# Patient Record
Sex: MALE | Race: BLACK OR AFRICAN AMERICAN | NOT HISPANIC OR LATINO | URBAN - METROPOLITAN AREA
[De-identification: names, ages, dates, MRNs, and addresses within clinical notes are randomized per-mention and may not be internally consistent; named-entity substitution may affect disease eponyms.]

---

## 2023-01-27 ENCOUNTER — INPATIENT HOSPITAL (AMBULATORY)
Dept: URBAN - METROPOLITAN AREA HOSPITAL 40 | Facility: HOSPITAL | Age: 23
End: 2023-01-27

## 2023-01-27 DIAGNOSIS — K52.9 NONINFECTIVE GASTROENTERITIS AND COLITIS, UNSPECIFIED: ICD-10-CM

## 2023-01-27 DIAGNOSIS — K50.90 CROHN'S DISEASE, UNSPECIFIED, WITHOUT COMPLICATIONS: ICD-10-CM

## 2023-01-27 DIAGNOSIS — K65.1 PERITONEAL ABSCESS: ICD-10-CM

## 2023-01-27 PROCEDURE — 99222 1ST HOSP IP/OBS MODERATE 55: CPT | Performed by: INTERNAL MEDICINE

## 2023-01-30 PROCEDURE — 99232 SBSQ HOSP IP/OBS MODERATE 35: CPT | Performed by: INTERNAL MEDICINE

## 2023-01-31 PROCEDURE — 99232 SBSQ HOSP IP/OBS MODERATE 35: CPT | Performed by: INTERNAL MEDICINE

## 2023-03-08 ENCOUNTER — OFFICE (AMBULATORY)
Dept: URBAN - METROPOLITAN AREA CLINIC 10 | Facility: CLINIC | Age: 23
End: 2023-03-08
Payer: COMMERCIAL

## 2023-03-08 VITALS
WEIGHT: 165 LBS | SYSTOLIC BLOOD PRESSURE: 120 MMHG | OXYGEN SATURATION: 99 % | DIASTOLIC BLOOD PRESSURE: 69 MMHG | HEIGHT: 71 IN | HEART RATE: 91 BPM

## 2023-03-08 DIAGNOSIS — K50.90 CROHN'S DISEASE, UNSPECIFIED, WITHOUT COMPLICATIONS: ICD-10-CM

## 2023-03-08 PROCEDURE — 99214 OFFICE O/P EST MOD 30 MIN: CPT | Performed by: REGISTERED NURSE

## 2023-03-10 ENCOUNTER — AMBULATORY SURGICAL CENTER (AMBULATORY)
Dept: URBAN - METROPOLITAN AREA SURGERY 1 | Facility: SURGERY | Age: 23
End: 2023-03-10
Payer: COMMERCIAL

## 2023-03-10 ENCOUNTER — OFFICE (AMBULATORY)
Dept: URBAN - METROPOLITAN AREA PATHOLOGY 21 | Facility: PATHOLOGY | Age: 23
End: 2023-03-10
Payer: COMMERCIAL

## 2023-03-10 VITALS
DIASTOLIC BLOOD PRESSURE: 61 MMHG | HEART RATE: 101 BPM | DIASTOLIC BLOOD PRESSURE: 49 MMHG | SYSTOLIC BLOOD PRESSURE: 123 MMHG | HEART RATE: 115 BPM | HEART RATE: 98 BPM | RESPIRATION RATE: 18 BRPM | TEMPERATURE: 97.6 F | OXYGEN SATURATION: 96 % | OXYGEN SATURATION: 97 % | HEIGHT: 71 IN | DIASTOLIC BLOOD PRESSURE: 71 MMHG | HEART RATE: 98 BPM | RESPIRATION RATE: 18 BRPM | HEART RATE: 95 BPM | OXYGEN SATURATION: 96 % | SYSTOLIC BLOOD PRESSURE: 88 MMHG | SYSTOLIC BLOOD PRESSURE: 109 MMHG | HEART RATE: 101 BPM | TEMPERATURE: 98.2 F | OXYGEN SATURATION: 95 % | DIASTOLIC BLOOD PRESSURE: 49 MMHG | HEIGHT: 71 IN | SYSTOLIC BLOOD PRESSURE: 111 MMHG | DIASTOLIC BLOOD PRESSURE: 66 MMHG | OXYGEN SATURATION: 97 % | SYSTOLIC BLOOD PRESSURE: 109 MMHG | DIASTOLIC BLOOD PRESSURE: 65 MMHG | HEART RATE: 96 BPM | HEART RATE: 98 BPM | OXYGEN SATURATION: 95 % | WEIGHT: 162 LBS | OXYGEN SATURATION: 95 % | RESPIRATION RATE: 18 BRPM | HEART RATE: 96 BPM | HEART RATE: 115 BPM | WEIGHT: 162 LBS | WEIGHT: 162 LBS | RESPIRATION RATE: 16 BRPM | DIASTOLIC BLOOD PRESSURE: 66 MMHG | DIASTOLIC BLOOD PRESSURE: 65 MMHG | OXYGEN SATURATION: 97 % | TEMPERATURE: 98.2 F | SYSTOLIC BLOOD PRESSURE: 123 MMHG | SYSTOLIC BLOOD PRESSURE: 109 MMHG | TEMPERATURE: 97.6 F | HEART RATE: 96 BPM | DIASTOLIC BLOOD PRESSURE: 49 MMHG | SYSTOLIC BLOOD PRESSURE: 111 MMHG | HEART RATE: 95 BPM | HEART RATE: 95 BPM | TEMPERATURE: 98.2 F | HEART RATE: 101 BPM | DIASTOLIC BLOOD PRESSURE: 65 MMHG | SYSTOLIC BLOOD PRESSURE: 88 MMHG | DIASTOLIC BLOOD PRESSURE: 66 MMHG | HEIGHT: 71 IN | SYSTOLIC BLOOD PRESSURE: 111 MMHG | DIASTOLIC BLOOD PRESSURE: 71 MMHG | RESPIRATION RATE: 16 BRPM | SYSTOLIC BLOOD PRESSURE: 123 MMHG | DIASTOLIC BLOOD PRESSURE: 61 MMHG | OXYGEN SATURATION: 96 % | DIASTOLIC BLOOD PRESSURE: 71 MMHG | SYSTOLIC BLOOD PRESSURE: 88 MMHG | RESPIRATION RATE: 16 BRPM | DIASTOLIC BLOOD PRESSURE: 61 MMHG | TEMPERATURE: 97.6 F | HEART RATE: 115 BPM

## 2023-03-10 DIAGNOSIS — K50.90 CROHN'S DISEASE, UNSPECIFIED, WITHOUT COMPLICATIONS: ICD-10-CM

## 2023-03-10 PROBLEM — K63.3 ULCER OF INTESTINE: Status: ACTIVE | Noted: 2023-03-10

## 2023-03-10 PROBLEM — K63.89 OTHER SPECIFIED DISEASES OF INTESTINE: Status: ACTIVE | Noted: 2023-03-10

## 2023-03-10 LAB
ANEMIA PROFILE A: BASO (ABSOLUTE): 0.1 X10E3/UL (ref 0–0.2)
ANEMIA PROFILE A: BASOS: 0 %
ANEMIA PROFILE A: EOS (ABSOLUTE): 0.1 X10E3/UL (ref 0–0.4)
ANEMIA PROFILE A: EOS: 1 %
ANEMIA PROFILE A: HEMATOCRIT: 43.6 % (ref 37.5–51)
ANEMIA PROFILE A: HEMOGLOBIN: 14.2 G/DL (ref 13–17.7)
ANEMIA PROFILE A: IMMATURE GRANS (ABS): 0 X10E3/UL (ref 0–0.1)
ANEMIA PROFILE A: IMMATURE GRANULOCYTES: 0 %
ANEMIA PROFILE A: IRON BIND.CAP.(TIBC): 276 UG/DL (ref 250–450)
ANEMIA PROFILE A: IRON SATURATION: 11 % — LOW (ref 15–55)
ANEMIA PROFILE A: IRON: 29 UG/DL — LOW (ref 38–169)
ANEMIA PROFILE A: LYMPHS (ABSOLUTE): 1.9 X10E3/UL (ref 0.7–3.1)
ANEMIA PROFILE A: LYMPHS: 16 %
ANEMIA PROFILE A: MCH: 26.9 PG (ref 26.6–33)
ANEMIA PROFILE A: MCHC: 32.6 G/DL (ref 31.5–35.7)
ANEMIA PROFILE A: MCV: 83 FL (ref 79–97)
ANEMIA PROFILE A: MONOCYTES(ABSOLUTE): 0.7 X10E3/UL (ref 0.1–0.9)
ANEMIA PROFILE A: MONOCYTES: 6 %
ANEMIA PROFILE A: NEUTROPHILS (ABSOLUTE): 8.9 X10E3/UL — HIGH (ref 1.4–7)
ANEMIA PROFILE A: NEUTROPHILS: 77 %
ANEMIA PROFILE A: PLATELETS: 487 X10E3/UL — HIGH (ref 150–450)
ANEMIA PROFILE A: RBC: 5.28 X10E6/UL (ref 4.14–5.8)
ANEMIA PROFILE A: RDW: 16.6 % — HIGH (ref 11.6–15.4)
ANEMIA PROFILE A: RETICULOCYTE COUNT: 0.9 % (ref 0.6–2.6)
ANEMIA PROFILE A: UIBC: 247 UG/DL (ref 111–343)
ANEMIA PROFILE A: WBC: 11.7 X10E3/UL — HIGH (ref 3.4–10.8)
C-REACTIVE PROTEIN, QUANT: 60 MG/L — HIGH (ref 0–10)
COMP. METABOLIC PANEL (14): A/G RATIO: 1.5 (ref 1.2–2.2)
COMP. METABOLIC PANEL (14): ALBUMIN: 4.3 G/DL (ref 4.1–5.2)
COMP. METABOLIC PANEL (14): ALKALINE PHOSPHATASE: 113 IU/L (ref 44–121)
COMP. METABOLIC PANEL (14): ALT (SGPT): 17 IU/L (ref 0–44)
COMP. METABOLIC PANEL (14): AST (SGOT): 13 IU/L (ref 0–40)
COMP. METABOLIC PANEL (14): BILIRUBIN, TOTAL: 0.3 MG/DL (ref 0–1.2)
COMP. METABOLIC PANEL (14): BUN/CREATININE RATIO: 10 (ref 9–20)
COMP. METABOLIC PANEL (14): BUN: 9 MG/DL (ref 6–20)
COMP. METABOLIC PANEL (14): CALCIUM: 10 MG/DL (ref 8.7–10.2)
COMP. METABOLIC PANEL (14): CARBON DIOXIDE, TOTAL: 25 MMOL/L (ref 20–29)
COMP. METABOLIC PANEL (14): CHLORIDE: 104 MMOL/L (ref 96–106)
COMP. METABOLIC PANEL (14): CREATININE: 0.89 MG/DL (ref 0.76–1.27)
COMP. METABOLIC PANEL (14): EGFR: 124 ML/MIN/1.73 (ref 59–?)
COMP. METABOLIC PANEL (14): GLOBULIN, TOTAL: 2.8 G/DL (ref 1.5–4.5)
COMP. METABOLIC PANEL (14): GLUCOSE: 93 MG/DL (ref 70–99)
COMP. METABOLIC PANEL (14): POTASSIUM: 4.7 MMOL/L (ref 3.5–5.2)
COMP. METABOLIC PANEL (14): PROTEIN, TOTAL: 7.1 G/DL (ref 6–8.5)
COMP. METABOLIC PANEL (14): SODIUM: 144 MMOL/L (ref 134–144)
HBSAG SCREEN: NEGATIVE
QUANTIFERON-TB GOLD PLUS: NEGATIVE
QUANTIFERON-TB GOLD PLUS: QUANTIFERON CRITERIA: (no result)
QUANTIFERON-TB GOLD PLUS: QUANTIFERON INCUBATION: (no result)
QUANTIFERON-TB GOLD PLUS: QUANTIFERON MITOGEN VALUE: 8.13 IU/ML
QUANTIFERON-TB GOLD PLUS: QUANTIFERON NIL VALUE: 0.41 IU/ML
QUANTIFERON-TB GOLD PLUS: QUANTIFERON TB1 AG VALUE: 0.23 IU/ML
QUANTIFERON-TB GOLD PLUS: QUANTIFERON TB2 AG VALUE: 0.3 IU/ML
SEDIMENTATION RATE-WESTERGREN: 24 MM/HR — HIGH (ref 0–15)

## 2023-03-10 PROCEDURE — 88305 TISSUE EXAM BY PATHOLOGIST: CPT | Performed by: PATHOLOGY

## 2023-03-10 PROCEDURE — 45380 COLONOSCOPY AND BIOPSY: CPT | Performed by: INTERNAL MEDICINE

## 2023-03-10 PROCEDURE — 88342 IMHCHEM/IMCYTCHM 1ST ANTB: CPT | Performed by: PATHOLOGY

## 2023-04-07 ENCOUNTER — OFFICE (AMBULATORY)
Dept: URBAN - METROPOLITAN AREA CLINIC 11 | Facility: CLINIC | Age: 23
End: 2023-04-07
Payer: COMMERCIAL

## 2023-04-07 VITALS
TEMPERATURE: 98.3 F | HEART RATE: 82 BPM | SYSTOLIC BLOOD PRESSURE: 114 MMHG | DIASTOLIC BLOOD PRESSURE: 71 MMHG | TEMPERATURE: 98.1 F | HEART RATE: 64 BPM | DIASTOLIC BLOOD PRESSURE: 65 MMHG | SYSTOLIC BLOOD PRESSURE: 110 MMHG | SYSTOLIC BLOOD PRESSURE: 111 MMHG | DIASTOLIC BLOOD PRESSURE: 63 MMHG | HEART RATE: 93 BPM | SYSTOLIC BLOOD PRESSURE: 112 MMHG | SYSTOLIC BLOOD PRESSURE: 106 MMHG | HEIGHT: 71 IN | SYSTOLIC BLOOD PRESSURE: 101 MMHG | HEART RATE: 83 BPM | WEIGHT: 166 LBS | HEART RATE: 92 BPM | DIASTOLIC BLOOD PRESSURE: 69 MMHG | DIASTOLIC BLOOD PRESSURE: 66 MMHG | DIASTOLIC BLOOD PRESSURE: 64 MMHG | HEART RATE: 65 BPM | RESPIRATION RATE: 18 BRPM | SYSTOLIC BLOOD PRESSURE: 105 MMHG | HEART RATE: 69 BPM

## 2023-04-07 DIAGNOSIS — K50.90 CROHN'S DISEASE, UNSPECIFIED, WITHOUT COMPLICATIONS: ICD-10-CM

## 2023-04-07 PROCEDURE — 96413 CHEMO IV INFUSION 1 HR: CPT | Performed by: INTERNAL MEDICINE

## 2023-04-07 PROCEDURE — 96415 CHEMO IV INFUSION ADDL HR: CPT | Performed by: INTERNAL MEDICINE

## 2023-04-07 NOTE — SERVICENOTES
Next Avsola infusion is on 4/21/23  at 1130 am

Patient observed for 15 minutes post infusion. 
Patient denies chest pain, shortness of breath or dizziness.  
Handout given and reviewed with patient.
Patient was instructed on common side effects.
Patient verbalized a complete understanding and has no questions.

## 2023-04-07 NOTE — SERVICEHPINOTES
See follow up visit from  3/8/2023   .  brDate / Results of last TB test  3/8/2023   -   Negative  brLabs and/or Additional orders:CBC &amp CMP due in July   brInsurance authorization: [facility] approved Avsola induction 3/21/23-9/20/23(BB)brPharmacy:  Buy and Bill  brRate of Infusion:  Standard   
br   Infusion order placed on:  3/24/2023   
br

## 2023-04-21 ENCOUNTER — OFFICE (AMBULATORY)
Dept: URBAN - METROPOLITAN AREA CLINIC 11 | Facility: CLINIC | Age: 23
End: 2023-04-21
Payer: COMMERCIAL

## 2023-04-21 VITALS
DIASTOLIC BLOOD PRESSURE: 65 MMHG | DIASTOLIC BLOOD PRESSURE: 72 MMHG | SYSTOLIC BLOOD PRESSURE: 107 MMHG | SYSTOLIC BLOOD PRESSURE: 108 MMHG | RESPIRATION RATE: 18 BRPM | HEART RATE: 69 BPM | DIASTOLIC BLOOD PRESSURE: 74 MMHG | HEART RATE: 60 BPM | HEART RATE: 68 BPM | WEIGHT: 170.4 LBS | TEMPERATURE: 98.1 F | HEART RATE: 66 BPM | HEIGHT: 71 IN | TEMPERATURE: 98.7 F | DIASTOLIC BLOOD PRESSURE: 60 MMHG | HEART RATE: 65 BPM | DIASTOLIC BLOOD PRESSURE: 68 MMHG | SYSTOLIC BLOOD PRESSURE: 121 MMHG | SYSTOLIC BLOOD PRESSURE: 110 MMHG | SYSTOLIC BLOOD PRESSURE: 115 MMHG

## 2023-04-21 DIAGNOSIS — K50.90 CROHN'S DISEASE, UNSPECIFIED, WITHOUT COMPLICATIONS: ICD-10-CM

## 2023-04-21 PROCEDURE — 96413 CHEMO IV INFUSION 1 HR: CPT | Performed by: INTERNAL MEDICINE

## 2023-04-21 PROCEDURE — 96415 CHEMO IV INFUSION ADDL HR: CPT | Performed by: INTERNAL MEDICINE

## 2023-04-21 NOTE — SERVICEHPINOTES
See follow up visit from  3/8/2023   .  brDate / Results of last TB test  3/8/2023   -   Negative  brLabs and/or Additional orders: CBC &amp CMP due in September.brInsurance authorization: [facility] approved Avsola induction 3/21/23-9/20/23(BB), 375 mgbrPharmacy:  Buy and Bill  brRate of Infusion:  Standard   
br   Infusion order placed on:  3/24/2023   
br

## 2023-05-19 ENCOUNTER — OFFICE (AMBULATORY)
Dept: URBAN - METROPOLITAN AREA CLINIC 11 | Facility: CLINIC | Age: 23
End: 2023-05-19
Payer: COMMERCIAL

## 2023-05-19 VITALS
DIASTOLIC BLOOD PRESSURE: 62 MMHG | SYSTOLIC BLOOD PRESSURE: 106 MMHG | SYSTOLIC BLOOD PRESSURE: 113 MMHG | HEART RATE: 67 BPM | HEIGHT: 71 IN | TEMPERATURE: 98.4 F | DIASTOLIC BLOOD PRESSURE: 73 MMHG | RESPIRATION RATE: 18 BRPM | WEIGHT: 180.4 LBS | DIASTOLIC BLOOD PRESSURE: 69 MMHG | SYSTOLIC BLOOD PRESSURE: 114 MMHG | DIASTOLIC BLOOD PRESSURE: 68 MMHG | TEMPERATURE: 98.3 F | DIASTOLIC BLOOD PRESSURE: 65 MMHG | DIASTOLIC BLOOD PRESSURE: 76 MMHG | SYSTOLIC BLOOD PRESSURE: 108 MMHG | SYSTOLIC BLOOD PRESSURE: 107 MMHG | HEART RATE: 65 BPM | HEART RATE: 62 BPM | HEART RATE: 60 BPM

## 2023-05-19 DIAGNOSIS — K50.90 CROHN'S DISEASE, UNSPECIFIED, WITHOUT COMPLICATIONS: ICD-10-CM

## 2023-05-19 PROCEDURE — 96415 CHEMO IV INFUSION ADDL HR: CPT | Performed by: INTERNAL MEDICINE

## 2023-05-19 PROCEDURE — 96413 CHEMO IV INFUSION 1 HR: CPT | Performed by: INTERNAL MEDICINE

## 2023-05-19 NOTE — SERVICEHPINOTES
See follow up visit from  3/8/2023   .  brDate / Results of last TB test  3/8/2023   -   Negative  brLabs and/or Additional orders: CBC and CMP due 09/2023brInsurance authorization: [facility] 09/20/23brPharmacy:  Buy and Bill  brRate of Infusion:  Standard   
br   Infusion order placed on:  3/24/2023   
br

## 2023-07-14 ENCOUNTER — OFFICE (AMBULATORY)
Dept: URBAN - METROPOLITAN AREA CLINIC 11 | Facility: CLINIC | Age: 23
End: 2023-07-14
Payer: COMMERCIAL

## 2023-07-14 VITALS
HEART RATE: 64 BPM | TEMPERATURE: 98.4 F | HEART RATE: 63 BPM | HEART RATE: 61 BPM | SYSTOLIC BLOOD PRESSURE: 120 MMHG | TEMPERATURE: 97 F | HEIGHT: 71 IN | SYSTOLIC BLOOD PRESSURE: 114 MMHG | DIASTOLIC BLOOD PRESSURE: 67 MMHG | HEART RATE: 94 BPM | SYSTOLIC BLOOD PRESSURE: 108 MMHG | DIASTOLIC BLOOD PRESSURE: 71 MMHG | HEART RATE: 73 BPM | DIASTOLIC BLOOD PRESSURE: 66 MMHG | WEIGHT: 193 LBS | SYSTOLIC BLOOD PRESSURE: 117 MMHG | DIASTOLIC BLOOD PRESSURE: 77 MMHG | RESPIRATION RATE: 18 BRPM | DIASTOLIC BLOOD PRESSURE: 55 MMHG | SYSTOLIC BLOOD PRESSURE: 124 MMHG | DIASTOLIC BLOOD PRESSURE: 70 MMHG

## 2023-07-14 DIAGNOSIS — K50.90 CROHN'S DISEASE, UNSPECIFIED, WITHOUT COMPLICATIONS: ICD-10-CM

## 2023-07-14 PROCEDURE — 96415 CHEMO IV INFUSION ADDL HR: CPT | Performed by: INTERNAL MEDICINE

## 2023-07-14 PROCEDURE — 96413 CHEMO IV INFUSION 1 HR: CPT | Performed by: INTERNAL MEDICINE

## 2023-07-14 NOTE — SERVICEHPINOTES
See follow up visit from  3/8/2023   .  brDate / Results of last TB test  3/8/2023   -   Negative  brLabs and/or Additional orders: CBC &amp CMP due in September.brInsurance authorization: [facility] approved start of Avsola 3/21/23-9/20/23(BB), ok to infuse per Mae CHANEY, task sent  that weight increased, current auth for 400 mg per chart note.brPharmacy:  Buy and Bill  brRate of Infusion:  Standard   
br   Infusion order placed on:  3/24/2023   
br

## 2023-10-06 ENCOUNTER — OFFICE (AMBULATORY)
Dept: URBAN - METROPOLITAN AREA CLINIC 11 | Facility: CLINIC | Age: 23
End: 2023-10-06
Payer: COMMERCIAL

## 2023-10-06 VITALS
DIASTOLIC BLOOD PRESSURE: 79 MMHG | SYSTOLIC BLOOD PRESSURE: 140 MMHG | WEIGHT: 198.6 LBS | RESPIRATION RATE: 18 BRPM | HEART RATE: 69 BPM | DIASTOLIC BLOOD PRESSURE: 73 MMHG | DIASTOLIC BLOOD PRESSURE: 74 MMHG | SYSTOLIC BLOOD PRESSURE: 135 MMHG | HEART RATE: 75 BPM | HEIGHT: 71 IN | SYSTOLIC BLOOD PRESSURE: 129 MMHG | TEMPERATURE: 98.1 F | HEART RATE: 77 BPM | SYSTOLIC BLOOD PRESSURE: 134 MMHG | HEART RATE: 64 BPM | TEMPERATURE: 98.9 F

## 2023-10-06 DIAGNOSIS — K50.90 CROHN'S DISEASE, UNSPECIFIED, WITHOUT COMPLICATIONS: ICD-10-CM

## 2023-10-06 PROCEDURE — 96413 CHEMO IV INFUSION 1 HR: CPT | Performed by: INTERNAL MEDICINE

## 2023-10-06 NOTE — SERVICEHPINOTES
See follow up visit from 3/8/2023 .brDate / Results of last TB test 3/8/2023 - NegativebrLabs and/or Additional orders: CBC &amp CMP due in April. Drawn today.brInsurance authorization: [facility] approved dose increase Avsola 9/8/23-9/7/24 (BB)brPharmacy: Buy and BillbrRate of Infusion: StandardbrInfusion order placed on: 3/24/2023

## 2023-12-01 ENCOUNTER — OFFICE (AMBULATORY)
Dept: URBAN - METROPOLITAN AREA CLINIC 11 | Facility: CLINIC | Age: 23
End: 2023-12-01
Payer: COMMERCIAL

## 2023-12-01 VITALS
RESPIRATION RATE: 20 BRPM | DIASTOLIC BLOOD PRESSURE: 83 MMHG | RESPIRATION RATE: 18 BRPM | SYSTOLIC BLOOD PRESSURE: 123 MMHG | HEART RATE: 61 BPM | HEART RATE: 76 BPM | HEART RATE: 64 BPM | SYSTOLIC BLOOD PRESSURE: 121 MMHG | SYSTOLIC BLOOD PRESSURE: 122 MMHG | DIASTOLIC BLOOD PRESSURE: 73 MMHG | TEMPERATURE: 98 F | TEMPERATURE: 97.6 F | SYSTOLIC BLOOD PRESSURE: 116 MMHG | DIASTOLIC BLOOD PRESSURE: 75 MMHG | HEART RATE: 77 BPM | WEIGHT: 202.2 LBS | HEIGHT: 71 IN | DIASTOLIC BLOOD PRESSURE: 74 MMHG

## 2023-12-01 DIAGNOSIS — K50.90 CROHN'S DISEASE, UNSPECIFIED, WITHOUT COMPLICATIONS: ICD-10-CM

## 2023-12-01 PROCEDURE — 96413 CHEMO IV INFUSION 1 HR: CPT | Performed by: INTERNAL MEDICINE

## 2023-12-01 NOTE — SERVICEHPINOTES
See follow up visit from 3/8/2023 .brDate / Results of last TB test 3/8/2023 - NegativebrLabs and/or Additional orders: CBC &amp CMP due in April. .brInsurance authorization: R approved dose increase Avsola 9/8/23-9/7/24 (BB)brPharmacy: Buy and BillbrRate of Infusion: StandardbrInfusion order placed on: 3/24/2023br

## 2024-01-26 ENCOUNTER — OFFICE (AMBULATORY)
Dept: URBAN - METROPOLITAN AREA CLINIC 11 | Facility: CLINIC | Age: 24
End: 2024-01-26
Payer: COMMERCIAL

## 2024-01-26 VITALS
RESPIRATION RATE: 18 BRPM | DIASTOLIC BLOOD PRESSURE: 74 MMHG | HEART RATE: 74 BPM | HEART RATE: 68 BPM | HEART RATE: 76 BPM | SYSTOLIC BLOOD PRESSURE: 114 MMHG | WEIGHT: 207 LBS | SYSTOLIC BLOOD PRESSURE: 117 MMHG | TEMPERATURE: 98.4 F | DIASTOLIC BLOOD PRESSURE: 69 MMHG | TEMPERATURE: 98.1 F | HEIGHT: 71 IN | DIASTOLIC BLOOD PRESSURE: 65 MMHG | DIASTOLIC BLOOD PRESSURE: 60 MMHG | HEART RATE: 81 BPM

## 2024-01-26 DIAGNOSIS — K50.90 CROHN'S DISEASE, UNSPECIFIED, WITHOUT COMPLICATIONS: ICD-10-CM

## 2024-01-26 PROCEDURE — 96413 CHEMO IV INFUSION 1 HR: CPT | Performed by: INTERNAL MEDICINE

## 2024-01-26 NOTE — SERVICEHPINOTES
See follow up visit from  3/8/2023   .  brDate / Results of last TB test  3/8/2023   -   Negative  brLabs and/or Additional orders: CBC and CMP due in AprilbrInsurance authorization:[facility] approved dose increase Avsola 9/8/23-9/7/24(BB)brPharmacy:  Buy and Bill  brRate of Infusion:  1 hour   
br   Infusion order placed on:  3/24/2023   
br

## 2025-04-02 ENCOUNTER — OFFICE (OUTPATIENT)
Dept: URBAN - METROPOLITAN AREA CLINIC 10 | Facility: CLINIC | Age: 25
End: 2025-04-02
Payer: COMMERCIAL

## 2025-04-02 VITALS
HEART RATE: 70 BPM | SYSTOLIC BLOOD PRESSURE: 134 MMHG | WEIGHT: 202 LBS | HEIGHT: 71 IN | OXYGEN SATURATION: 99 % | DIASTOLIC BLOOD PRESSURE: 79 MMHG | SYSTOLIC BLOOD PRESSURE: 131 MMHG | DIASTOLIC BLOOD PRESSURE: 92 MMHG

## 2025-04-02 DIAGNOSIS — K50.90 CROHN'S DISEASE, UNSPECIFIED, WITHOUT COMPLICATIONS: ICD-10-CM

## 2025-04-02 DIAGNOSIS — R19.7 DIARRHEA, UNSPECIFIED: ICD-10-CM

## 2025-04-02 PROCEDURE — 99214 OFFICE O/P EST MOD 30 MIN: CPT | Performed by: REGISTERED NURSE

## 2025-04-11 LAB
C-REACTIVE PROTEIN, QUANT: 3 MG/L (ref 0–10)
CBC WITH DIFFERENTIAL/PLATELET: BASO (ABSOLUTE): 0.1 X10E3/UL (ref 0–0.2)
CBC WITH DIFFERENTIAL/PLATELET: BASOS: 1 %
CBC WITH DIFFERENTIAL/PLATELET: EOS (ABSOLUTE): 0.1 X10E3/UL (ref 0–0.4)
CBC WITH DIFFERENTIAL/PLATELET: EOS: 1 %
CBC WITH DIFFERENTIAL/PLATELET: HEMATOCRIT: 51.8 % — HIGH (ref 37.5–51)
CBC WITH DIFFERENTIAL/PLATELET: HEMOGLOBIN: 17.3 G/DL (ref 13–17.7)
CBC WITH DIFFERENTIAL/PLATELET: IMMATURE GRANS (ABS): 0 X10E3/UL (ref 0–0.1)
CBC WITH DIFFERENTIAL/PLATELET: IMMATURE GRANULOCYTES: 0 %
CBC WITH DIFFERENTIAL/PLATELET: LYMPHS (ABSOLUTE): 2.3 X10E3/UL (ref 0.7–3.1)
CBC WITH DIFFERENTIAL/PLATELET: LYMPHS: 28 %
CBC WITH DIFFERENTIAL/PLATELET: MCH: 29.2 PG (ref 26.6–33)
CBC WITH DIFFERENTIAL/PLATELET: MCHC: 33.4 G/DL (ref 31.5–35.7)
CBC WITH DIFFERENTIAL/PLATELET: MCV: 88 FL (ref 79–97)
CBC WITH DIFFERENTIAL/PLATELET: MONOCYTES(ABSOLUTE): 0.6 X10E3/UL (ref 0.1–0.9)
CBC WITH DIFFERENTIAL/PLATELET: MONOCYTES: 7 %
CBC WITH DIFFERENTIAL/PLATELET: NEUTROPHILS (ABSOLUTE): 5.3 X10E3/UL (ref 1.4–7)
CBC WITH DIFFERENTIAL/PLATELET: NEUTROPHILS: 63 %
CBC WITH DIFFERENTIAL/PLATELET: PLATELETS: 295 X10E3/UL (ref 150–450)
CBC WITH DIFFERENTIAL/PLATELET: RBC: 5.92 X10E6/UL — HIGH (ref 4.14–5.8)
CBC WITH DIFFERENTIAL/PLATELET: RDW: 12.5 % (ref 11.6–15.4)
CBC WITH DIFFERENTIAL/PLATELET: WBC: 8.4 X10E3/UL (ref 3.4–10.8)
COMP. METABOLIC PANEL (14): ALBUMIN: 5.1 G/DL (ref 4.3–5.2)
COMP. METABOLIC PANEL (14): ALKALINE PHOSPHATASE: 96 IU/L (ref 44–121)
COMP. METABOLIC PANEL (14): ALT (SGPT): 47 IU/L — HIGH (ref 0–44)
COMP. METABOLIC PANEL (14): AST (SGOT): 28 IU/L (ref 0–40)
COMP. METABOLIC PANEL (14): BILIRUBIN, TOTAL: 0.2 MG/DL (ref 0–1.2)
COMP. METABOLIC PANEL (14): BUN/CREATININE RATIO: 16 (ref 9–20)
COMP. METABOLIC PANEL (14): BUN: 14 MG/DL (ref 6–20)
COMP. METABOLIC PANEL (14): CALCIUM: 10.3 MG/DL — HIGH (ref 8.7–10.2)
COMP. METABOLIC PANEL (14): CARBON DIOXIDE, TOTAL: 20 MMOL/L (ref 20–29)
COMP. METABOLIC PANEL (14): CHLORIDE: 102 MMOL/L (ref 96–106)
COMP. METABOLIC PANEL (14): CREATININE: 0.89 MG/DL (ref 0.76–1.27)
COMP. METABOLIC PANEL (14): EGFR: 123 ML/MIN/1.73 (ref 59–?)
COMP. METABOLIC PANEL (14): GLOBULIN, TOTAL: 2.7 G/DL (ref 1.5–4.5)
COMP. METABOLIC PANEL (14): GLUCOSE: 98 MG/DL (ref 70–99)
COMP. METABOLIC PANEL (14): POTASSIUM: 4.9 MMOL/L (ref 3.5–5.2)
COMP. METABOLIC PANEL (14): PROTEIN, TOTAL: 7.8 G/DL (ref 6–8.5)
COMP. METABOLIC PANEL (14): SODIUM: 139 MMOL/L (ref 134–144)
HBSAG SCREEN: NEGATIVE
INFLIXIMAB DRUG + ANTIBODY: ANTI-INFLIXIMAB ANTIBODY: <22 NG/ML
INFLIXIMAB DRUG + ANTIBODY: INFLIXIMAB DRUG LEVEL: 4 UG/ML
PDF REPORT: PDF REPORT1: (no result)
QUANTIFERON-TB GOLD PLUS: NEGATIVE
QUANTIFERON-TB GOLD PLUS: QUANTIFERON CRITERIA: (no result)
QUANTIFERON-TB GOLD PLUS: QUANTIFERON INCUBATION: (no result)
QUANTIFERON-TB GOLD PLUS: QUANTIFERON MITOGEN VALUE: >10 IU/ML
QUANTIFERON-TB GOLD PLUS: QUANTIFERON NIL VALUE: 1.46 IU/ML
QUANTIFERON-TB GOLD PLUS: QUANTIFERON TB1 AG VALUE: 0.92 IU/ML
QUANTIFERON-TB GOLD PLUS: QUANTIFERON TB2 AG VALUE: 1.3 IU/ML
SERIAL MONITORING: PDF: (no result)